# Patient Record
Sex: MALE | Race: WHITE | Employment: UNEMPLOYED | URBAN - METROPOLITAN AREA
[De-identification: names, ages, dates, MRNs, and addresses within clinical notes are randomized per-mention and may not be internally consistent; named-entity substitution may affect disease eponyms.]

---

## 2020-11-24 ENCOUNTER — HOSPITAL ENCOUNTER (EMERGENCY)
Age: 20
Discharge: HOME | End: 2020-11-24
Payer: COMMERCIAL

## 2020-11-24 VITALS
TEMPERATURE: 98.6 F | BODY MASS INDEX: 21.98 KG/M2 | RESPIRATION RATE: 20 BRPM | DIASTOLIC BLOOD PRESSURE: 80 MMHG | HEART RATE: 84 BPM | OXYGEN SATURATION: 97 % | HEIGHT: 68 IN | SYSTOLIC BLOOD PRESSURE: 126 MMHG | WEIGHT: 145 LBS

## 2020-11-24 PROCEDURE — 12011 RPR F/E/E/N/L/M 2.5 CM/<: CPT

## 2020-11-24 PROCEDURE — 99283 EMERGENCY DEPT VISIT LOW MDM: CPT

## 2020-11-24 ASSESSMENT — ENCOUNTER SYMPTOMS
VOMITING: 0
RHINORRHEA: 0
DIARRHEA: 0
ABDOMINAL PAIN: 0
WHEEZING: 0
NAUSEA: 0
COUGH: 0
SHORTNESS OF BREATH: 0

## 2020-11-24 ASSESSMENT — PAIN DESCRIPTION - PAIN TYPE: TYPE: ACUTE PAIN

## 2020-11-24 ASSESSMENT — PAIN SCALES - GENERAL: PAINLEVEL_OUTOF10: 4

## 2020-11-24 NOTE — ED PROVIDER NOTES
[address] [facility]        Pt Name: Eder Soni  MRN: [id number]  [place] 2000  Date of evaluation: 11/24/2020  Provider: Jagdeep Mixon PA-C  PCP: Juan Antonio Cardozo MD    LEONIDAS. I have evaluated this patient. My supervising physician was available for consultation. CHIEF COMPLAINT       Chief Complaint   Patient presents with    Head Injury     Pt to Er with c/o small laceration to right forehead after metal shelf fell a few feet and hit him in the head. denies LOc. bleeding controlled. ODACS states no. [employer] [place]       HISTORY OF PRESENT ILLNESS   (Location, Timing/Onset, Context/Setting, Quality, Duration, Modifying Factors, Severity, Associated Signs and Symptoms)  Note limiting factors. Eder Soni is a 23 y.o. male who presents for evaluation of forehead laceration. Patient states that he was at work at [employer] when he was trying to get a TV off a shelf, the shelf fell off the pegboard and hit him in the forehead. There is no active bleeding. He denies loss of consciousness. No dizziness/lightheadedness, weakness or visual disturbances. Tetanus is up-to-date. He denies any neck pain. No other injuries or complaints at this time. Nursing Notes were all reviewed and agreed with or any disagreements were addressed in the HPI. REVIEW OF SYSTEMS    (2-9 systems for level 4, 10 or more for level 5)     Review of Systems   Constitutional: Negative for appetite change, chills and fever. HENT: Negative for congestion and rhinorrhea. Respiratory: Negative for cough, shortness of breath and wheezing. Cardiovascular: Negative for chest pain. Gastrointestinal: Negative for abdominal pain, diarrhea, nausea and vomiting. Genitourinary: Negative for difficulty urinating, dysuria and hematuria. Musculoskeletal: Negative for neck pain and neck stiffness. Skin: Positive for wound. Negative for rash.    Neurological: Negative for weakness, numbness and headaches. Positives and Pertinent negatives as per HPI. Except as noted above in the ROS, all other systems were reviewed and negative. PAST MEDICAL HISTORY   History reviewed. No pertinent past medical history. SURGICAL HISTORY     Past Surgical History:   Procedure Laterality Date    HERNIA REPAIR           CURRENTMEDICATIONS       Previous Medications    ALBUTEROL IN    Inhale into the lungs    METHYLPHENIDATE (CONCERTA) 36 MG CR TABLET    Take 1 tablet by mouth every morning. ALLERGIES     Patient has no known allergies. FAMILYHISTORY     History reviewed. No pertinent family history. SOCIAL HISTORY       Social History     Tobacco Use    Smoking status: Never Smoker    Smokeless tobacco: Never Used   Substance Use Topics    Alcohol use: Yes     Comment: occ    Drug use: Never       SCREENINGS             PHYSICAL EXAM    (up to 7 for level 4, 8 or more for level 5)     ED Triage Vitals [11/24/20 1648]   BP Temp Temp Source Pulse Resp SpO2 Height Weight   126/80 98.6 °F (37 °C) Oral 84 20 97 % 5' 8\" (1.727 m) 145 lb (65.8 kg)       Physical Exam  Vitals signs and nursing note reviewed. Constitutional:       Appearance: He is well-developed. He is not diaphoretic. HENT:      Head: Normocephalic. Laceration present. No raccoon eyes, Hill's sign, abrasion or contusion. Right Ear: External ear normal.      Left Ear: External ear normal.      Nose: Nose normal.   Eyes:      General:         Right eye: No discharge. Left eye: No discharge. Neck:      Musculoskeletal: Normal range of motion and neck supple. Pulmonary:      Effort: Pulmonary effort is normal. No respiratory distress. Musculoskeletal: Normal range of motion. Skin:     General: Skin is warm and dry. Neurological:      Mental Status: He is alert and oriented to person, place, and time. Mental status is at baseline. GCS: GCS eye subscore is 4. GCS verbal subscore is 5. GCS motor subscore is 6. Cranial Nerves: Cranial nerves are intact. Psychiatric:         Behavior: Behavior normal.         DIAGNOSTIC RESULTS   LABS:    Labs Reviewed - No data to display    All other labs were within normal range or not returned as of this dictation. EKG: All EKG's are interpreted by the Emergency Department Physician in the absence of a cardiologist.  Please see their note for interpretation of EKG. RADIOLOGY:   Non-plain film images such as CT, Ultrasound and MRI are read by the radiologist. Plain radiographic images are visualized and preliminarily interpreted by the ED Provider with the below findings:        Interpretation per the Radiologist below, if available at the time of this note:    No orders to display     No results found. PROCEDURES   Unless otherwise noted below, none     Procedures  Laceration Repair Procedure Note    Indication: Laceration    Procedure: The patient was placed in the appropriate position and anesthesia around the laceration was not performed at the patient's request. The area was then irrigated with Shur-Clens and normal saline. The laceration was closed with staples. There were no additional lacerations requiring repair. The wound area was then dressed with bacitracin. Total repaired wound length: 1 cm. Other Items: Staple count: 1    The patient tolerated the procedure well. Complications: None      CRITICAL CARE TIME   N/A    CONSULTS:  None      EMERGENCY DEPARTMENT COURSE and DIFFERENTIAL DIAGNOSIS/MDM:   Vitals:    Vitals:    11/24/20 1648   BP: 126/80   Pulse: 84   Resp: 20   Temp: 98.6 °F (37 °C)   TempSrc: Oral   SpO2: 97%   Weight: 145 lb (65.8 kg)   Height: 5' 8\" (1.727 m)       Patient was given the following medications:  Medications - No data to display        Patient presents for evaluation of head injury, scalp laceration.   On exam, he is resting comfortably in bed no acute distress and nontoxic. Vitals are stable and he is afebrile. He has a 1 cm slightly gaping laceration to right upper forehead past hairline. There is no active bleeding. No tenderness step-offs or crepitus. Remainder of scalp is atraumatic. Neck is nontender. Laceration was repaired per procedure note patient tolerated that difficulty. Tetanus up-to-date. Symptomatic and supportive care was discussed including wound care and follow-up for staple removal in 10 to 12 days. Conditions for return to the ED were discussed such as any new or worsening symptoms or signs of more acute head injury, neurovascular compromise, tractable pain, wound dehiscence/rebleed or infection. Is agreeable to this plan stable for discharge at this time. FINAL IMPRESSION      1.  Laceration of forehead, initial encounter          DISPOSITION/PLAN   DISPOSITION Decision To Discharge 11/24/2020 06:10:15 PM      PATIENT REFERREDTO:  *[address]  [phone]    Call   For staple removal in 10-12 days    [facility] Emergency Department  [place] [address]  [phone]  Go to   If symptoms worsen      DISCHARGE MEDICATIONS:  New Prescriptions    No medications on file       DISCONTINUED MEDICATIONS:  Discontinued Medications    No medications on file              (Please note that portions of this note were completed with a voice recognition program.  Efforts were made to edit the dictations but occasionally words are mis-transcribed.)    Vitaliy Smith PA-C (electronically signed)            Sully Heart PA-C  11/24/20 2255

## 2020-11-24 NOTE — ED NOTES
Pt requesting to sit in car and answer phone when ready to brought to room, aware cannot sit in car but can sit in bench out front if does not want to be in waiting room with others, agreeable at this time.      Antonia Coleman RN  11/24/20 0055